# Patient Record
Sex: MALE | URBAN - METROPOLITAN AREA
[De-identification: names, ages, dates, MRNs, and addresses within clinical notes are randomized per-mention and may not be internally consistent; named-entity substitution may affect disease eponyms.]

---

## 2022-05-31 ENCOUNTER — CLINICAL SUPPORT (OUTPATIENT)
Dept: REHABILITATION | Facility: OTHER | Age: 57
End: 2022-05-31
Payer: MEDICAID

## 2022-05-31 DIAGNOSIS — M25.612 DECREASED RANGE OF MOTION OF LEFT SHOULDER: ICD-10-CM

## 2022-05-31 DIAGNOSIS — R29.898 SHOULDER WEAKNESS: ICD-10-CM

## 2022-05-31 DIAGNOSIS — R68.89 DECREASED FUNCTIONAL ACTIVITY TOLERANCE: ICD-10-CM

## 2022-05-31 PROCEDURE — 97161 PT EVAL LOW COMPLEX 20 MIN: CPT

## 2022-05-31 PROCEDURE — 97110 THERAPEUTIC EXERCISES: CPT

## 2022-05-31 PROCEDURE — 97530 THERAPEUTIC ACTIVITIES: CPT
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
## 2022-06-10 ENCOUNTER — CLINICAL SUPPORT (OUTPATIENT)
Dept: REHABILITATION | Facility: OTHER | Age: 57
End: 2022-06-10
Attending: NURSE PRACTITIONER
Payer: MEDICAID

## 2022-06-10 DIAGNOSIS — R68.89 DECREASED FUNCTIONAL ACTIVITY TOLERANCE: Primary | ICD-10-CM

## 2022-06-10 DIAGNOSIS — M25.612 DECREASED RANGE OF MOTION OF LEFT SHOULDER: ICD-10-CM

## 2022-06-10 DIAGNOSIS — R29.898 SHOULDER WEAKNESS: ICD-10-CM

## 2022-06-10 PROCEDURE — 97110 THERAPEUTIC EXERCISES: CPT

## 2022-06-10 NOTE — PROGRESS NOTES
"Physical Therapy Daily Treatment Note     Name: Jay [facility] Number: [id number]    Therapy Diagnosis:   Encounter Diagnoses   Name Primary?    Decreased functional activity tolerance Yes    Shoulder weakness     Decreased range of motion of left shoulder      Physician: Gaby Lan NP-C    Visit Date: 6/10/2022  Physician Orders: PT Eval and Treat   Medical Diagnosis from Referral: Left shoulder pain [M25.512]  Evaluation Date: 5/31/2022  Authorization Period Expiration: 12/31/2022  Plan of Care Expiration: 07/26/2022  Visit # / Visits authorized: 2/ 1    Progress Note Due: 06/20/2022  FOTO: 1/1     Precautions: Standard     Time In: 0935  Time Out: 1030  Total Appointment Time (timed & untimed codes): 55 minutes    Subjective     Merrill arrives to PT and reports a 3/10 L shoulder pain."The exercises I was given are helping for sure".     He was compliant with home exercise program.  Response to previous treatment: Fine  Functional change: NA    Pain:  Current 3/10, worst 10/10, best 0/10   Location: left shoulder   Description: Sharp and Shooting  Aggravating Factors: reaching across body, fast motions, lifting  Easing Factors: rest, no movement     Current Level of Function: independent with all ADL's, however, increased pain/difficulty with lifting and reaching tasks that involve left shoulder      Pts goals: "get rid of pain especially when I lift my arm"     Objective       Jay received therapeutic exercises to develop strength, endurance, ROM, flexibility, posture and core stabilization for 50 minutes including:    Aerobic/Cardio Exercise: UBE at level 1 for 6 minutes total 3:3 (F/B)    Pulleys 2 minutes each flexion and abduction   SOC ==> Scap retractions 20x5"  No Moneys with RTB 2x10  Supine shoulder Hrzt Abduction with RTB 2x 10   Rows with RTB 2x 10  +Supine shoulder flexion x20  +Supine serratus punch x20    Jay received the following manual therapy techniques: 5 " Minutes:  DMT/CFR/MFR with Jefry pain cream to Deltoids, distal pecs and RC muscles, seated L UE supported on pillow     Jay participated in neuromuscular re-education: 00 Minutes    Jay received hot pack for 00 minutes.    Jay received cold pack for 00 minutes.      Home Exercises Provided and Patient Education Provided     Education provided:     Written Home Exercises Provided: Patient instructed to cont prior HEP.  Exercises were reviewed and Jay was able to demonstrate them prior to the end of the session.  Jay demonstrated good  understanding of the education provided.     See EMR under Patient Instructions for exercises provided prior visit.    Assessment     Merrill reported improved pain post therex. New exercises were added to address poor neck/shoulders alignment/position. HEP was updated to include today's additional exercises.     Jay Is progressing  towards his goals.   Pt prognosis is Good.     Pt will continue to benefit from skilled outpatient physical therapy to address the deficits listed in the problem list box on initial evaluation, provide pt/family education and to maximize pt's level of independence in the home and community environment.     Pt's spiritual, cultural and educational needs considered and pt agreeable to plan of care and goals.     Anticipated barriers to physical therapy: None    GOALS: Short Term Goals: 4 weeks  1.Report decreased in pain at worse less than  <   / =  9 /10  to increase tolerance for functional mobility. On going  2. Pt to improve range of motion by 25% to allow for improved functional mobility to allow for improvement in IADLs. On going  3. Increased bilateral shoulder MMT 1/2 grade to increase tolerance for ADL and work activities. On going  4. Pt to report be conscious of impaired sitting and standing posture daily to decrease pain. On going  5. Pt to tolerate HEP to improve ROM and independence with ADL's. On going     Long Term  Goals: 8 weeks  1.Report decreased in pain at worse less than  <   / =  1  /10  to increase tolerance for functional mobility. On going  2.  Patient will demonstrate improved overall function per FOTO Survey to 33% score or less.On going  3.Increased bilateral shoulder MMT 1 grade to increase tolerance for ADL and work activities.On going  4. Pt to report and demonstrate proper posture in standing and sitting to decrease pain. On going  5. Pt to be Independent with HEP to improve ROM and independence with ADL's.On going  6. Pt to improve range of motion by 75% to allow for improved functional mobility to allow for improvement in IADLs. On going    Plan     Plan of care Certification: 5/31/2022 to 07/26/2022    Puja Jaquez, PT

## 2022-06-15 ENCOUNTER — CLINICAL SUPPORT (OUTPATIENT)
Dept: REHABILITATION | Facility: OTHER | Age: 57
End: 2022-06-15
Attending: NURSE PRACTITIONER
Payer: MEDICAID

## 2022-06-15 DIAGNOSIS — R29.898 SHOULDER WEAKNESS: ICD-10-CM

## 2022-06-15 DIAGNOSIS — R68.89 DECREASED FUNCTIONAL ACTIVITY TOLERANCE: Primary | ICD-10-CM

## 2022-06-15 DIAGNOSIS — M25.612 DECREASED RANGE OF MOTION OF LEFT SHOULDER: ICD-10-CM

## 2022-06-15 PROCEDURE — 97110 THERAPEUTIC EXERCISES: CPT

## 2022-06-15 NOTE — PROGRESS NOTES
"Physical Therapy Daily Treatment Note     Name: Jay Gilliland Joyce  Clinic Number: [id number]    Therapy Diagnosis:   Encounter Diagnoses   Name Primary?    Decreased functional activity tolerance Yes    Shoulder weakness     Decreased range of motion of left shoulder      Physician: Gaby Lan NP-C    Visit Date: 6/15/2022  Physician Orders: PT Eval and Treat   Medical Diagnosis from Referral: Left shoulder pain [M25.512]  Evaluation Date: 5/31/2022  Authorization Period Expiration: 12/31/2022  Plan of Care Expiration: 07/26/2022  Visit # / Visits authorized: 3/ 1    Progress Note Due: 06/20/2022  FOTO: 1/1     Precautions: Standard     Time In: 1106  Time Out: 1200  Total Appointment Time (timed & untimed codes): 54 minutes    Subjective     Merrill returns to PT today and denies any pain currently, the shoulder has bene feeling better. He denies any soreness after last session. "Lately it don't seem to get to bad with the pain".     He was compliant with home exercise program.  Response to previous treatment: Fine  Functional change: NA    Pain:  Current 0/10, worst 8/10 lately (10/10 at eval), best 0/10   Location: Left shoulder   Description: Sharp and Shooting  Aggravating Factors: reaching across body, fast motions, lifting  Easing Factors: rest, no movement     Current Level of Function: independent with all ADL's, however, increased pain/difficulty with lifting and reaching tasks that involve left shoulder      Pts goals: "get rid of pain especially when I lift my arm"     Objective       Merrill received therapeutic exercises to develop strength, endurance, ROM, flexibility, posture and core stabilization for 54 minutes including:    Aerobic/Cardio Exercise: UBE at level 2 for 6 minutes total 3:3 (F/B)    Pulleys 2 minutes each flexion and abduction   SOC ==> Scap retractions 20x5"  No Moneys with RTB 2x10  Supine shoulder Hrzt Abduction with RTB 2x 10  Single single UE hrzt abduction with RTB x10 " "each side  Rows with RTB x20  Supine shoulder protractions and retractions with 3# dowel 2x 10  + Mini Shoulder extension (waist level) and scap retraction with RTB 2x10 to engage sap depressors  + Full shoulders extension with RTB 10x3"  +Shoulder taps in quadruped alternating x10 each   +Cat/cow in quadruped x10  +Prone row+elbow ext with 5# DB x10 each side    Supine shoulder flexion ==> painful (hold 6/15/22)    Merrill received the following manual therapy techniques: 00 Minutes:  DMT/CFR/MFR with Jefry pain cream to Deltoids, distal pecs and RC muscles, seated L UE supported on pillow     Merrill participated in neuromuscular re-education: 00 Minutes    Merrill received hot pack for 00 minutes.    Merrill received cold pack for 00 minutes.      Home Exercises Provided and Patient Education Provided     Education provided:     Written Home Exercises Provided: Patient instructed to cont prior HEP.  Exercises were reviewed and Merrill was able to demonstrate them prior to the end of the session.  Merrill demonstrated good  understanding of the education provided.     See EMR under Patient Instructions for exercises provided prior visit.    Assessment     Merrill tolerated therex progression well without increasing pain at L shoulder. However constant cues provided to prevent scap elevation compensation with exercises. Address scap depressors strengthening, with GHJ and scap/thoracic joint stabilization and strengthening Bilaterally.       Merrill Is progressing  towards his goals.   Pt prognosis is Good.     Pt will continue to benefit from skilled outpatient physical therapy to address the deficits listed in the problem list box on initial evaluation, provide pt/family education and to maximize pt's level of independence in the home and community environment.     Pt's spiritual, cultural and educational needs considered and pt agreeable to plan of care and goals.     Anticipated barriers to physical therapy: " None    GOALS: Short Term Goals: 4 weeks  1.Report decreased in pain at worse less than  <   / =  9 /10  to increase tolerance for functional mobility. On going  2. Pt to improve range of motion by 25% to allow for improved functional mobility to allow for improvement in IADLs. On going  3. Increased bilateral shoulder MMT 1/2 grade to increase tolerance for ADL and work activities. On going  4. Pt to report be conscious of impaired sitting and standing posture daily to decrease pain. On going  5. Pt to tolerate HEP to improve ROM and independence with ADL's. On going     Long Term Goals: 8 weeks  1.Report decreased in pain at worse less than  <   / =  1  /10  to increase tolerance for functional mobility. On going  2.  Patient will demonstrate improved overall function per FOTO Survey to 33% score or less.On going  3.Increased bilateral shoulder MMT 1 grade to increase tolerance for ADL and work activities.On going  4. Pt to report and demonstrate proper posture in standing and sitting to decrease pain. On going  5. Pt to be Independent with HEP to improve ROM and independence with ADL's.On going  6. Pt to improve range of motion by 75% to allow for improved functional mobility to allow for improvement in IADLs. On going    Plan     Plan of care Certification: 5/31/2022 to 07/26/2022    Puja Jaquez, PT

## 2022-06-16 NOTE — PROGRESS NOTES
"Physical Therapy Daily Treatment Note     Name: Jay Gilliland [place]  Clinic Number: [id number]    Therapy Diagnosis:   Encounter Diagnoses   Name Primary?    Decreased functional activity tolerance Yes    Shoulder weakness     Decreased range of motion of left shoulder      Physician: Gaby Lan NP-C    Visit Date: 6/17/2022  Physician Orders: PT Eval and Treat   Medical Diagnosis from Referral: Left shoulder pain [M25.512]  Evaluation Date: 5/31/2022  Authorization Period Expiration: 12/31/2022  Plan of Care Expiration: 07/26/2022  Visit # / Visits authorized: 4/20  Progress Note Due: 06/20/2022  FOTO: 1/1     Precautions: Standard     Time In:8:30am  Time Out: 9:25am  Total Appointment Time (timed & untimed codes): 55 minutes    Subjective   Patient states he is still having pain with overhead motions and woke up with a muscle spasm in L shoulder this morning.     He was compliant with home exercise program.  Response to previous treatment: Fine  Functional change: NA    Pain:  Current 0/10, worst 8/10 lately (10/10 at eval), best 0/10   Location: Left shoulder   Description: Sharp and Shooting  Aggravating Factors: reaching across body, fast motions, lifting  Easing Factors: rest, no movement     Current Level of Function: independent with all ADL's, however, increased pain/difficulty with lifting and reaching tasks that involve left shoulder      Pts goals: "get rid of pain especially when I lift my arm"     Objective       Merrill received therapeutic exercises to develop strength, endurance, ROM, flexibility, posture and core stabilization for 55 minutes including:    Aerobic/Cardio Exercise: UBE at level 2 for 6 minutes total 3:3 (F/B)     Pulleys 2 minutes each flexion and abduction   SOC ==> Scap retractions 20x5"  No Moneys with RTB x15,3"  Half FR:Supine shoulder Hrzt Abduction with RTB x15, SA punches w/3# dowel x20   Rows with GTB 2x10,3"   Full shoulders extension with RTB 10x3"  Shoulder taps " in quadruped alternating x10 each   Cat/cow in quadruped x15  Prone row+elbow ext with 5# DB 2x10 each side  +Shoulder flexion w/pillowcase at table x10    Supine shoulder flexion ==> painful (hold 6/15/22)    Merrill received the following manual therapy techniques: 10 Minutes:  Posterior/inferior glides to R GH joint grave III, distraction/oscillations, Passive ER    Merrill participated in neuromuscular re-education: 00 Minutes    Merrill received hot pack for 00 minutes.    Merrill received cold pack for 00 minutes.      Home Exercises Provided and Patient Education Provided     Education provided:     Written Home Exercises Provided: Patient instructed to cont prior HEP.  Exercises were reviewed and Merrill was able to demonstrate them prior to the end of the session.  Merrill demonstrated good  understanding of the education provided.     See EMR under Patient Instructions for exercises provided prior visit.    Assessment   Patient still unable to perform shoulder flexion in a supine position without increased pain. Recommended patient begin pulley ex at home to address decreased ROM. Progressed resistance with rows and provided GTB for home use. Patient requires max VC to keep UT quiet. Continue to progress as patient tolerates.          Merrill Is progressing  towards his goals.   Pt prognosis is Good.     Pt will continue to benefit from skilled outpatient physical therapy to address the deficits listed in the problem list box on initial evaluation, provide pt/family education and to maximize pt's level of independence in the home and community environment.     Pt's spiritual, cultural and educational needs considered and pt agreeable to plan of care and goals.     Anticipated barriers to physical therapy: None    GOALS: Short Term Goals: 4 weeks  1.Report decreased in pain at worse less than  <   / =  9 /10  to increase tolerance for functional mobility. On going  2. Pt to improve range of motion by 25% to  allow for improved functional mobility to allow for improvement in IADLs. On going  3. Increased bilateral shoulder MMT 1/2 grade to increase tolerance for ADL and work activities. On going  4. Pt to report be conscious of impaired sitting and standing posture daily to decrease pain. On going  5. Pt to tolerate HEP to improve ROM and independence with ADL's. On going     Long Term Goals: 8 weeks  1.Report decreased in pain at worse less than  <   / =  1  /10  to increase tolerance for functional mobility. On going  2.  Patient will demonstrate improved overall function per FOTO Survey to 33% score or less.On going  3.Increased bilateral shoulder MMT 1 grade to increase tolerance for ADL and work activities.On going  4. Pt to report and demonstrate proper posture in standing and sitting to decrease pain. On going  5. Pt to be Independent with HEP to improve ROM and independence with ADL's.On going  6. Pt to improve range of motion by 75% to allow for improved functional mobility to allow for improvement in IADLs. On going    Plan     Plan of care Certification: 5/31/2022 to 07/26/2022    Gaby Silva PTA

## 2022-06-17 ENCOUNTER — CLINICAL SUPPORT (OUTPATIENT)
Dept: REHABILITATION | Facility: OTHER | Age: 57
End: 2022-06-17
Attending: NURSE PRACTITIONER
Payer: MEDICAID

## 2022-06-17 DIAGNOSIS — R29.898 SHOULDER WEAKNESS: ICD-10-CM

## 2022-06-17 DIAGNOSIS — R68.89 DECREASED FUNCTIONAL ACTIVITY TOLERANCE: Primary | ICD-10-CM

## 2022-06-17 DIAGNOSIS — M25.612 DECREASED RANGE OF MOTION OF LEFT SHOULDER: ICD-10-CM

## 2022-06-17 PROCEDURE — 97110 THERAPEUTIC EXERCISES: CPT | Mod: CQ

## 2022-06-20 ENCOUNTER — CLINICAL SUPPORT (OUTPATIENT)
Dept: REHABILITATION | Facility: OTHER | Age: 57
End: 2022-06-20
Attending: NURSE PRACTITIONER
Payer: MEDICAID

## 2022-06-20 DIAGNOSIS — M25.612 DECREASED RANGE OF MOTION OF LEFT SHOULDER: ICD-10-CM

## 2022-06-20 DIAGNOSIS — R68.89 DECREASED FUNCTIONAL ACTIVITY TOLERANCE: Primary | ICD-10-CM

## 2022-06-20 DIAGNOSIS — R29.898 SHOULDER WEAKNESS: ICD-10-CM

## 2022-06-20 PROCEDURE — 97110 THERAPEUTIC EXERCISES: CPT | Mod: CQ

## 2022-06-20 NOTE — PROGRESS NOTES
"Physical Therapy Daily Treatment Note     Name: Jay Gilliland [place]  Clinic Number: [id number]    Therapy Diagnosis:   Encounter Diagnoses   Name Primary?    Decreased functional activity tolerance Yes    Shoulder weakness     Decreased range of motion of left shoulder      Physician: Gaby Lan NP-C    Visit Date: 6/20/2022  Physician Orders: PT Eval and Treat   Medical Diagnosis from Referral: Left shoulder pain [M25.512]  Evaluation Date: 5/31/2022  Authorization Period Expiration: 12/31/2022  Plan of Care Expiration: 07/26/2022  Visit # / Visits authorized: 5/20  Progress Note Due: 06/20/2022  FOTO: 2/2     Precautions: Standard     Time In: 8:55 am  Time Out: 9:55 am  Total Appointment Time (timed & untimed codes): 55 minutes    Subjective   Patient states "I haven't noticed too much pain lately.  Ever since I started doing these exercises it hasn't been bothering me nearly as much".  He still reports sharp pain with quick overhead motions.    He was compliant with home exercise program.  Response to previous treatment: No increase in soreness  Functional change: NA    Pain:  Current 0/10, worst 8/10 lately (10/10 at eval), best 0/10   Location: Left shoulder   Description: Sharp and Shooting  Aggravating Factors: reaching across body, fast motions, lifting  Easing Factors: rest, no movement     Current Level of Function: independent with all ADL's, however, increased pain/difficulty with lifting and reaching tasks that involve left shoulder      Pts goals: "get rid of pain especially when I lift my arm"     Objective       Merrill received therapeutic exercises to develop strength, endurance, ROM, flexibility, posture and core stabilization for 45 minutes including:    Aerobic/Cardio Exercise: UBE at level 2 for 6 minutes total 3:3 (F/B)     Pulleys 2 minutes each flexion and abduction   SOC ==> Scap retractions 20x5"  No Moneys with RTB x15,3"  Half FR:Supine shoulder Hrzt Abduction with RTB x+20, SA " "punches w/3# dowel x20   Supine shoulder flexion x20  +SL ER #1 2 x10  Rows with GTB 2x10,3"   Full shoulders extension with RTB 10x3"  Shoulder taps in quadruped alternating x10 each   Cat/cow in quadruped x15  Prone row+elbow ext with 5# DB 2x10 each side  Shoulder flexion w/pillowcase at table x10  +Serratus wall slides 2x10  +Sleeper stretch 3x20"      Merrill received the following manual therapy techniques: 10 Minutes:  Posterior/inferior glides to L GH joint grade III, distraction/oscillations, Passive ER    Merrill participated in neuromuscular re-education: 00 Minutes    Merrill received hot pack for 00 minutes.    Merrill received cold pack for 5 minutes.      Home Exercises Provided and Patient Education Provided     Education provided:     Written Home Exercises Provided: Patient instructed to cont prior HEP.  Exercises were reviewed and Merrill was able to demonstrate them prior to the end of the session.  Merrill demonstrated good  understanding of the education provided.     See EMR under Patient Instructions for exercises provided prior visit.    Assessment   Merrill returned reporting minimal to no L shoulder pain and expressing satisfaction with progress made so far.  L shoulder strengthening, postural strengthening, L shoulder flexibility continued with Merrill able to perform supine shoulder flexion with less difficulty and pain today.  Treatment progressed by adding SL ER, serratus wall slides and sleeper stretch.  He tolerated today's progressions well with no increase in pain.  FOTO limitation score improved to 49% indicating slight improvement in function and pain reduction.  Will continue to progress per pt's tolerance.    Merrill Is progressing  towards his goals.   Pt prognosis is Good.     Pt will continue to benefit from skilled outpatient physical therapy to address the deficits listed in the problem list box on initial evaluation, provide pt/family education and to maximize pt's level " of independence in the home and community environment.     Pt's spiritual, cultural and educational needs considered and pt agreeable to plan of care and goals.     Anticipated barriers to physical therapy: None    GOALS: Short Term Goals: 4 weeks  1.Report decreased in pain at worse less than  <   / =  9 /10  to increase tolerance for functional mobility. On going  2. Pt to improve range of motion by 25% to allow for improved functional mobility to allow for improvement in IADLs. On going  3. Increased bilateral shoulder MMT 1/2 grade to increase tolerance for ADL and work activities. On going  4. Pt to report be conscious of impaired sitting and standing posture daily to decrease pain. On going  5. Pt to tolerate HEP to improve ROM and independence with ADL's. On going     Long Term Goals: 8 weeks  1.Report decreased in pain at worse less than  <   / =  1  /10  to increase tolerance for functional mobility. On going  2.  Patient will demonstrate improved overall function per FOTO Survey to 33% score or less.On going  3.Increased bilateral shoulder MMT 1 grade to increase tolerance for ADL and work activities.On going  4. Pt to report and demonstrate proper posture in standing and sitting to decrease pain. On going  5. Pt to be Independent with HEP to improve ROM and independence with ADL's.On going  6. Pt to improve range of motion by 75% to allow for improved functional mobility to allow for improvement in IADLs. On going    Plan     Plan of care Certification: 5/31/2022 to 07/26/2022    Brody Burgos, PTA

## 2022-06-24 ENCOUNTER — CLINICAL SUPPORT (OUTPATIENT)
Dept: REHABILITATION | Facility: OTHER | Age: 57
End: 2022-06-24
Attending: NURSE PRACTITIONER
Payer: MEDICAID

## 2022-06-24 DIAGNOSIS — R29.898 SHOULDER WEAKNESS: ICD-10-CM

## 2022-06-24 DIAGNOSIS — M25.612 DECREASED RANGE OF MOTION OF LEFT SHOULDER: ICD-10-CM

## 2022-06-24 DIAGNOSIS — R68.89 DECREASED FUNCTIONAL ACTIVITY TOLERANCE: Primary | ICD-10-CM

## 2022-06-24 PROCEDURE — 97110 THERAPEUTIC EXERCISES: CPT | Mod: CQ

## 2022-06-24 NOTE — PROGRESS NOTES
"Physical Therapy Daily Treatment Note     Name: Jay Gilliland [place]  Clinic Number: [id number]    Therapy Diagnosis:   Encounter Diagnoses   Name Primary?    Decreased functional activity tolerance Yes    Shoulder weakness     Decreased range of motion of left shoulder      Physician: Gaby Lan NP-C    Visit Date: 6/24/2022  Physician Orders: PT Eval and Treat   Medical Diagnosis from Referral: Left shoulder pain [M25.512]  Evaluation Date: 5/31/2022  Authorization Period Expiration: 12/31/2022  Plan of Care Expiration: 07/26/2022  Visit # / Visits authorized: 6/20    Progress Note Due: 06/20/2022 (PT not available for reassessment today-Will plan for reassessment next visit)  FOTO: 2/2     Precautions: Standard     Time In: 10:00 am  Time Out: 11:00 am  Total Appointment Time (timed & untimed codes): 55 minutes    Subjective   Patient states that his shoulder is feeling pretty good. " I don't feel like it is much of a handicap now." Still reports intermittent shoulder pain when reaching overhead. States that he was dealing with some stress due to the loss of his cell phone service.     He was compliant with home exercise program.  Response to previous treatment: No increase in soreness  Functional change: NA    Pain:  Current 0/10, worst 3/10 lately (10/10 at eval), best 0/10   Location: Left shoulder   Description: Sharp and Shooting  Aggravating Factors: reaching across body, fast motions, lifting  Easing Factors: rest, no movement     Current Level of Function: independent with all ADL's, however, increased pain/difficulty with lifting and reaching tasks that involve left shoulder      Pts goals: "get rid of pain especially when I lift my arm"     Objective       Merrill received therapeutic exercises to develop strength, endurance, ROM, flexibility, posture and core stabilization for 45 minutes including:    Aerobic/Cardio Exercise: UBE at level 3 for 6 minutes total 3:3 (F/B)     Pulleys 2 minutes each " "flexion and abduction   SOC ==> Scap retractions 20x5"  No Moneys with GTB 2 x10,3"  Serratus wall slides w/RTB 2x10  Rows with GTB 2x10,3"  shoulder extension with GTB2x 10x3"  Shoulder taps alternating standing against plinth x10 each   Supine over Half FR:   Supine shoulder Hrzt Abduction with RTB x+20,    SA punches w/3# DBx20    Supine shoulder flexion w/ 2# dowel 2 x 10    SL ER 2# 2 x10  Cat/cow in quadruped x15  +Quadruped Reach roll and lift x 10  Prone row+elbow ext with 5# DB 2x10 each side  Shoulder flexion w/pillowcase at table x10  Sleeper stretch 3x20"  +Prone over T-ball (on Mat)   Y's x10   T'sx10   w's x 10  +Supine Rhythmic stabilization 3 x 20 sec    Merrill received the following manual therapy techniques: 10 Minutes:  Posterior/inferior glides to L GH joint grade III, distraction/oscillations, Passive ER/IR      Merrill received cold pack for 5 minutes.    Home Exercises Provided and Patient Education Provided     Education provided:   -Cues with ex's  -Postural awareness    Written Home Exercises Provided: Patient instructed to cont prior HEP.  Exercises were reviewed and Merrill was able to demonstrate them prior to the end of the session.  Merrill demonstrated good  understanding of the education provided.     See EMR under Patient Instructions for exercises provided prior visit.    Assessment   Merrill returns without c/o pain today. Treatment continued with Shoulder/postural flexibility and strengthening ex's. He was progressed with increased resistance on UBE, increased resistance to RTB for serratus slides. Also added prone strengthening over T-ball and supine rhythmic stabilization.  Some weakness evident with Rhythmic stabilization ex. Patient able to perform ex's and progressions within tolerable muscular fatigue and without increased pain.  He does require intermittent cues for correct ex performance. PT was not available for reassessment this visit and will look to arrange this for " pt's next visit. Will monitor and attempt to progress as tolerated.     Merrill Is progressing towards his goals.   Pt prognosis is Good.     Pt will continue to benefit from skilled outpatient physical therapy to address the deficits listed in the problem list box on initial evaluation, provide pt/family education and to maximize pt's level of independence in the home and community environment.     Pt's spiritual, cultural and educational needs considered and pt agreeable to plan of care and goals.     Anticipated barriers to physical therapy: None    GOALS: Short Term Goals: 4 weeks  1.Report decreased in pain at worse less than  <   / =  9 /10  to increase tolerance for functional mobility. On going  2. Pt to improve range of motion by 25% to allow for improved functional mobility to allow for improvement in IADLs. On going  3. Increased bilateral shoulder MMT 1/2 grade to increase tolerance for ADL and work activities. On going  4. Pt to report be conscious of impaired sitting and standing posture daily to decrease pain. On going  5. Pt to tolerate HEP to improve ROM and independence with ADL's. On going     Long Term Goals: 8 weeks  1.Report decreased in pain at worse less than  <   / =  1  /10  to increase tolerance for functional mobility. On going  2.  Patient will demonstrate improved overall function per FOTO Survey to 33% score or less.On going  3.Increased bilateral shoulder MMT 1 grade to increase tolerance for ADL and work activities.On going  4. Pt to report and demonstrate proper posture in standing and sitting to decrease pain. On going  5. Pt to be Independent with HEP to improve ROM and independence with ADL's.On going  6. Pt to improve range of motion by 75% to allow for improved functional mobility to allow for improvement in IADLs. On going    Plan     Plan of care Certification: 5/31/2022 to 07/26/2022    Outpatient Physical Therapy 2 times weekly for 8 weeks to include the following  interventions: Cervical/Lumbar Traction, Electrical Stimulation PRN, Gait Training, Iontophoresis (with PRN), Manual Therapy, Moist Heat/ Ice, Neuromuscular Re-ed, Patient Education, Self Care, Therapeutic Activities and Therapeutic Exercise. Dry needling Progress HEP towards D/C. Recommend F/U with MD if symptoms worsen or do not resolve. Patient may be seen by a PTA for treatment to carry out their plan of care.  Face-to-face conferences will be held.    Otis Rodgers PTA

## 2022-06-28 ENCOUNTER — CLINICAL SUPPORT (OUTPATIENT)
Dept: REHABILITATION | Facility: OTHER | Age: 57
End: 2022-06-28
Attending: NURSE PRACTITIONER
Payer: MEDICAID

## 2022-06-28 DIAGNOSIS — M25.612 DECREASED RANGE OF MOTION OF LEFT SHOULDER: ICD-10-CM

## 2022-06-28 DIAGNOSIS — R29.898 SHOULDER WEAKNESS: ICD-10-CM

## 2022-06-28 DIAGNOSIS — R68.89 DECREASED FUNCTIONAL ACTIVITY TOLERANCE: Primary | ICD-10-CM

## 2022-06-28 PROCEDURE — 97110 THERAPEUTIC EXERCISES: CPT

## 2022-06-28 NOTE — PROGRESS NOTES
"Physical Therapy Daily Treatment Note     Name: Jay Joyce  Clinic Number: [id number]    Therapy Diagnosis:   Encounter Diagnoses   Name Primary?    Decreased functional activity tolerance Yes    Shoulder weakness     Decreased range of motion of left shoulder      Physician: Gaby Lan NP-C    Visit Date: 6/28/2022  Physician Orders: PT Eval and Treat   Medical Diagnosis from Referral: Left shoulder pain [M25.512]  Evaluation Date: 5/31/2022  Authorization Period Expiration: 12/31/2022  Plan of Care Expiration: 07/26/2022  Visit # / Visits authorized: 6/20    Progress Note Due: 07/28/2022   FOTO: 2/2     Precautions: Standard     Time In: 10:00 am  Time Out: 11:00 am  Total Appointment Time (timed & untimed codes): 55 minutes    Subjective   Patient states he has been having overall decreased pain lately. Since Friday his arm has been a little achier than normal.     He was compliant with home exercise program.  Response to previous treatment: No increase in soreness  Functional change: NA    Pain:  Current 1/10, worst 3/10 lately (10/10 at eval), best 0/10   Location: Left shoulder   Description: Sharp and Shooting  Aggravating Factors: reaching across body, fast motions, lifting  Easing Factors: rest, no movement     Current Level of Function: independent with all ADL's, however, increased pain/difficulty with lifting and reaching tasks that involve left shoulder      Pts goals: "get rid of pain especially when I lift my arm"     Objective   Reassessment 6/28/22  Active Range of Motion (degrees):   Shoulder Right Left   Flexion 170 150   Abduction 175 150   ER WFL 70   IR  WFL 60*      Upper Extremity Strength  (R) UE   (L) UE     Elbow flexion: 4+/5 Elbow flexion: 4+/5   Elbow extension: 4+/5 Elbow extension: 4+/5   Shoulder elevation: 4/5 Shoulder elevation: 4-/5   Shoulder flexion: 4/5 Shoulder flexion: 4-/5   Shoulder Abduction: 4/5 Shoulder abduction: 3+/5   Shoulder ER 4+/5 Shoulder ER 3+/5 " "  Shoulder IR 4+/5 Shoulder IR 3+/5   Lower Trap 3/5 Lower Trap 3/5   Middle Trap 3/5 Middle Trap 3/5   Rhomboids 3/5 Rhomboids 3/5          Merrill received therapeutic exercises to develop strength, endurance, ROM, flexibility, posture and core stabilization for 60 minutes including:    Reassessment    Aerobic/Cardio Exercise: UBE at level 3 for 6 minutes total 3:3 (F/B)     Pulleys 2 minutes each flexion and abduction   SOC ==> Scap retractions 20x5"  No Moneys with GTB 2 x10,3"  Serratus wall slides w/RTB 2x10  Rows with GTB 3x10,3"  shoulder extension with GTB2x 10x3"   Shoulder taps alternating standing against plinth x10 each   Supine over Half FR:   Supine shoulder Hrzt Abduction with RTB x+20,    SA punches w/3# DBx20    Supine shoulder flexion w/ 2# dowel 2 x 10    SL ER 2# 2 x10  Cat/cow in quadruped x15  +Quadruped Reach roll and lift x 10  Prone row+elbow ext with 5# DB 2x10 each side  Shoulder flexion w/pillowcase at table x10  Sleeper stretch 3x20"  Prone over T-ball (on Mat)   Y's x10   T'sx10   w's x 10  Supine Rhythmic stabilization 3 x 20 sec  +supine shoulder flexion with 3# dowel x 20  +Supine serratus punches with 3# dowel x 20  +SL flexion to 90 degrees 1# 2x5  +SL abduction      Merrill received the following manual therapy techniques: 0 Minutes:  Posterior/inferior glides to L GH joint grade III, distraction/oscillations, Passive ER/IR      Merrill received cold pack for 5 minutes.    Home Exercises Provided and Patient Education Provided     Education provided:   -Cues with ex's  -Postural awareness    Written Home Exercises Provided: Patient instructed to cont prior HEP.  Exercises were reviewed and Merrill was able to demonstrate them prior to the end of the session.  Merrill demonstrated good  understanding of the education provided.     See EMR under Patient Instructions for exercises provided prior visit.    Assessment   Merrill was reassessed today. Despite reports of improvement in " pain levels, he demos minimal improvements in strength since evaluation. Limitations are especially evident with flexion and abduction. Today's treatment session focused on flexion abduction strengthening in both standing and in sidelying. He continues to require intermittent cues for correct ex performance. Cont to progress as appropriate.     Merrill Is progressing towards his goals.   Pt prognosis is Good.     Pt will continue to benefit from skilled outpatient physical therapy to address the deficits listed in the problem list box on initial evaluation, provide pt/family education and to maximize pt's level of independence in the home and community environment.     Pt's spiritual, cultural and educational needs considered and pt agreeable to plan of care and goals.     Anticipated barriers to physical therapy: None    GOALS: Short Term Goals: 4 weeks  1.Report decreased in pain at worse less than  <   / =  9 /10  to increase tolerance for functional mobility. On going  2. Pt to improve range of motion by 25% to allow for improved functional mobility to allow for improvement in IADLs. On going  3. Increased bilateral shoulder MMT 1/2 grade to increase tolerance for ADL and work activities. On going  4. Pt to report be conscious of impaired sitting and standing posture daily to decrease pain. On going  5. Pt to tolerate HEP to improve ROM and independence with ADL's. On going     Long Term Goals: 8 weeks  1.Report decreased in pain at worse less than  <   / =  1  /10  to increase tolerance for functional mobility. On going  2.  Patient will demonstrate improved overall function per FOTO Survey to 33% score or less.On going  3.Increased bilateral shoulder MMT 1 grade to increase tolerance for ADL and work activities.On going  4. Pt to report and demonstrate proper posture in standing and sitting to decrease pain. On going  5. Pt to be Independent with HEP to improve ROM and independence with ADL's.On going  6. Pt  to improve range of motion by 75% to allow for improved functional mobility to allow for improvement in IADLs. On going    Plan     Plan of care Certification: 5/31/2022 to 07/26/2022    Outpatient Physical Therapy 2 times weekly for 8 weeks to include the following interventions: Cervical/Lumbar Traction, Electrical Stimulation PRN, Gait Training, Iontophoresis (with PRN), Manual Therapy, Moist Heat/ Ice, Neuromuscular Re-ed, Patient Education, Self Care, Therapeutic Activities and Therapeutic Exercise. Dry needling Progress HEP towards D/C. Recommend F/U with MD if symptoms worsen or do not resolve. Patient may be seen by a PTA for treatment to carry out their plan of care.  Face-to-face conferences will be held.    Miles Samayoa, PT

## 2022-06-30 NOTE — PROGRESS NOTES
"Physical Therapy Daily Treatment Note     Name: Jay Joyce  Clinic Number: [id number]    Therapy Diagnosis:   Encounter Diagnoses   Name Primary?    Decreased functional activity tolerance Yes    Shoulder weakness     Decreased range of motion of left shoulder      Physician: Gaby Lan NP-C    Visit Date: 7/1/2022  Physician Orders: PT Eval and Treat   Medical Diagnosis from Referral: Left shoulder pain [M25.512]  Evaluation Date: 5/31/2022  Authorization Period Expiration: 12/31/2022  Plan of Care Expiration: 07/26/2022  Visit # / Visits authorized: 8/20    Progress Note Due: 07/28/2022   FOTO: 2/2     Precautions: Standard     Time In: 10:25 am  Time Out: 11:30 am  Total Appointment Time (timed & untimed codes): 55 minutes    Subjective   Patient states he doesn't notice much of a change with activities yet.   He was not compliant with home exercise program.  Response to previous treatment: No increase in soreness  Functional change: None yet    Pain:  Current 1/10, worst 3/10 lately (10/10 at eval), best 0/10   Location: Left shoulder   Description: Sharp and Shooting  Aggravating Factors: reaching across body, fast motions, lifting  Easing Factors: rest, no movement     Current Level of Function: independent with all ADL's, however, increased pain/difficulty with lifting and reaching tasks that involve left shoulder      Pts goals: "get rid of pain especially when I lift my arm"   Objective   Reassessment 6/28/22  Active Range of Motion (degrees):   Shoulder Right Left   Flexion 170 150   Abduction 175 150   ER WFL 70   IR  WFL 60*      Upper Extremity Strength  (R) UE   (L) UE     Elbow flexion: 4+/5 Elbow flexion: 4+/5   Elbow extension: 4+/5 Elbow extension: 4+/5   Shoulder elevation: 4/5 Shoulder elevation: 4-/5   Shoulder flexion: 4/5 Shoulder flexion: 4-/5   Shoulder Abduction: 4/5 Shoulder abduction: 3+/5   Shoulder ER 4+/5 Shoulder ER 3+/5   Shoulder IR 4+/5 Shoulder IR 3+/5   Lower Trap " "3/5 Lower Trap 3/5   Middle Trap 3/5 Middle Trap 3/5   Rhomboids 3/5 Rhomboids 3/5          Merrill received therapeutic exercises to develop strength, endurance, ROM, flexibility, posture and core stabilization for 55 minutes including:    Reassessment    Aerobic/Cardio Exercise: UBE at level 3 for 6 minutes total 3:3 (F/B) For shlder ROM    Pulleys 2 minutes each flexion and abduction   SOC ==> Scap retractions 20x5"  No Moneys with GTB 2 x10,3"  Serratus wall slides w/RTB w/lift off x10  Rows with GTB 3x10,3"  +shoulder IR w/RTB x20  +[place] carry w.10# in both hands 2 laps   +Bicep curl 38# x15  +Tricep 32# x15  shoulder extension with GTB2x 10x3"   Shoulder taps alternating standing against plinth x10 each   Supine over Half FR:   Supine shoulder Hrzt Abduction with GTB +20,    SA punches w/4# DBx20    Supine shoulder flexion w/ 3# dowel 2x10    B SL ER 2# 2 x10  Cat/cow in quadruped x15  +Quadruped Reach roll and lift x 10  Prone row+elbow ext with 5# DB 2x10 each side  Shoulder flexion w/pillowcase at table x10  Sleeper stretch 3x20"  Prone    Y's x10   T'sx10,3   w's x10,3"  Supine Rhythmic stabilization 3 x 20 sec  SL flexion to 90 degrees 1# 2x5  SL abduction w/2# x20 (not able on L)      Merrill received the following manual therapy techniques: 0 Minutes:  Posterior/inferior glides to L GH joint grade III, distraction/oscillations, Passive ER/IR      Merrill received cold pack for 5 minutes.    Home Exercises Provided and Patient Education Provided     Education provided:   -Cues with ex's  -Postural awareness    Written Home Exercises Provided: Patient instructed to cont prior HEP.  Exercises were reviewed and Merrill was able to demonstrate them prior to the end of the session.  Merrill demonstrated good  understanding of the education provided.     See EMR under Patient Instructions for exercises provided prior visit.    Assessment   Educated patient on importance of more frequent performance of HEP " to address significant weakness not allowing for pain free shoulder flex and HBB. Patient required cueing for proper. Unable to perform SL shoulder abd without increased pain. Cont to progress as appropriate.     Merrill Is progressing towards his goals.   Pt prognosis is Good.     Pt will continue to benefit from skilled outpatient physical therapy to address the deficits listed in the problem list box on initial evaluation, provide pt/family education and to maximize pt's level of independence in the home and community environment.     Pt's spiritual, cultural and educational needs considered and pt agreeable to plan of care and goals.     Anticipated barriers to physical therapy: None    GOALS: Short Term Goals: 4 weeks  1.Report decreased in pain at worse less than  <   / =  9 /10  to increase tolerance for functional mobility. On going  2. Pt to improve range of motion by 25% to allow for improved functional mobility to allow for improvement in IADLs. On going  3. Increased bilateral shoulder MMT 1/2 grade to increase tolerance for ADL and work activities. On going  4. Pt to report be conscious of impaired sitting and standing posture daily to decrease pain. On going  5. Pt to tolerate HEP to improve ROM and independence with ADL's. On going     Long Term Goals: 8 weeks  1.Report decreased in pain at worse less than  <   / =  1  /10  to increase tolerance for functional mobility. On going  2.  Patient will demonstrate improved overall function per FOTO Survey to 33% score or less.On going  3.Increased bilateral shoulder MMT 1 grade to increase tolerance for ADL and work activities.On going  4. Pt to report and demonstrate proper posture in standing and sitting to decrease pain. On going  5. Pt to be Independent with HEP to improve ROM and independence with ADL's.On going  6. Pt to improve range of motion by 75% to allow for improved functional mobility to allow for improvement in IADLs. On going    Plan      Plan of care Certification: 5/31/2022 to 07/26/2022    Outpatient Physical Therapy 2 times weekly for 8 weeks to include the following interventions: Cervical/Lumbar Traction, Electrical Stimulation PRN, Gait Training, Iontophoresis (with PRN), Manual Therapy, Moist Heat/ Ice, Neuromuscular Re-ed, Patient Education, Self Care, Therapeutic Activities and Therapeutic Exercise. Dry needling Progress HEP towards D/C. Recommend F/U with MD if symptoms worsen or do not resolve. Patient may be seen by a PTA for treatment to carry out their plan of care.  Face-to-face conferences will be held.    Gaby Silva PTA

## 2022-07-01 ENCOUNTER — CLINICAL SUPPORT (OUTPATIENT)
Dept: REHABILITATION | Facility: OTHER | Age: 57
End: 2022-07-01
Attending: NURSE PRACTITIONER
Payer: MEDICAID

## 2022-07-01 DIAGNOSIS — R68.89 DECREASED FUNCTIONAL ACTIVITY TOLERANCE: Primary | ICD-10-CM

## 2022-07-01 DIAGNOSIS — M25.612 DECREASED RANGE OF MOTION OF LEFT SHOULDER: ICD-10-CM

## 2022-07-01 DIAGNOSIS — R29.898 SHOULDER WEAKNESS: ICD-10-CM

## 2022-07-01 PROCEDURE — 97110 THERAPEUTIC EXERCISES: CPT | Mod: CQ

## 2023-04-30 ENCOUNTER — HOSPITAL ENCOUNTER (EMERGENCY)
Facility: OTHER | Age: 58
Discharge: HOME | End: 2023-04-30
Attending: EMERGENCY MEDICINE
Payer: MEDICAID

## 2023-04-30 VITALS
OXYGEN SATURATION: 98 % | DIASTOLIC BLOOD PRESSURE: 63 MMHG | SYSTOLIC BLOOD PRESSURE: 107 MMHG | TEMPERATURE: 98 F | BODY MASS INDEX: 25.69 KG/M2 | WEIGHT: 145 LBS | HEART RATE: 83 BPM | HEIGHT: 63 IN | RESPIRATION RATE: 18 BRPM

## 2023-04-30 DIAGNOSIS — L03.114 CELLULITIS OF LEFT HAND: Primary | ICD-10-CM

## 2023-04-30 PROCEDURE — 99283 EMERGENCY DEPT VISIT LOW MDM: CPT

## 2023-04-30 PROCEDURE — 25000003 PHARM REV CODE 250: Performed by: EMERGENCY MEDICINE

## 2023-04-30 RX ORDER — CLINDAMYCIN HYDROCHLORIDE 150 MG/1
450 CAPSULE ORAL EVERY 8 HOURS
Qty: 63 CAPSULE | Refills: 0 | Status: SHIPPED | OUTPATIENT
Start: 2023-04-30 | End: 2023-05-07

## 2023-04-30 RX ORDER — CLINDAMYCIN HYDROCHLORIDE 150 MG/1
450 CAPSULE ORAL
Status: COMPLETED | OUTPATIENT
Start: 2023-04-30 | End: 2023-04-30

## 2023-04-30 RX ADMIN — CLINDAMYCIN HYDROCHLORIDE 450 MG: 150 CAPSULE ORAL at 02:04

## 2023-04-30 NOTE — ED PROVIDER NOTES
Encounter Date: 4/30/2023    SCRIBE #1 NOTE: I, Chetnaherb Rogers, am scribing for, and in the presence of,  Ruth Dasilva MD.     History     Chief Complaint   Patient presents with    Hand Injury     Reports cutting left thumb with an angle  approx 1 week ago. +swelling and redness noted to left hand and wrist.      Parvez Lockhart is a 57 y.o. male who presents to the ED with swelling to his left hand after cutting it with a metal blade 1 week ago. The swelling has progressively worsened for the past few days. He denies fever, chills, or body ache. He tried soaking his hand in epsom salt, and cleaning with peroxide and alcohol. He also took 4-5 doxycycline pills he had in his house.  Tetanus is up-to-date.    The history is provided by the patient.   Review of patient's allergies indicates:   Allergen Reactions    Metoprolol     Flagyl [metronidazole] Hives     No past medical history on file.  No past surgical history on file.  No family history on file.     Review of Systems   Constitutional:  Negative for chills and fever.   HENT:  Negative for sore throat.    Respiratory:  Negative for shortness of breath.    Cardiovascular:  Negative for chest pain.   Gastrointestinal:  Negative for nausea.   Genitourinary:  Negative for dysuria.   Musculoskeletal:  Negative for back pain.        Left arm swelling.   Skin:  Positive for wound. Negative for rash.   Neurological:  Negative for weakness.   Hematological:  Does not bruise/bleed easily.   All other systems reviewed and are negative.    Physical Exam     Initial Vitals [04/30/23 0124]   BP Pulse Resp Temp SpO2   107/63 83 18 97.8 °F (36.6 °C) 98 %      MAP       --         Physical Exam    Constitutional: He appears well-developed and well-nourished. He does not have a sickly appearance. No distress.   HENT:   Head: Normocephalic and atraumatic.   Right Ear: External ear normal.   Left Ear: External ear normal.   Neck: Phonation normal. No stridor present. No tracheal  deviation present.   Normal range of motion.  Cardiovascular:  Normal rate, regular rhythm and normal heart sounds.     Exam reveals no friction rub.       No murmur heard.  Musculoskeletal:         General: Edema present.      Cervical back: Normal range of motion.      Comments: Trace pitting edema of dorsum of left hand and fingers. Full ROM all digits. No tenderness to palpation of palm. Compartments of left forearm and hand are soft. No lymphangitis.      Neurological: He is alert and oriented to person, place, and time. He has normal strength. GCS eye subscore is 4. GCS verbal subscore is 5. GCS motor subscore is 6.   Skin: Skin is warm.   Healing linear laceration to base of left thumb.    Psychiatric: He has a normal mood and affect. His speech is normal and behavior is normal. Judgment and thought content normal. Cognition and memory are normal.       ED Course   Procedures  Labs Reviewed - No data to display       Imaging Results    None          Medications   clindamycin capsule 450 mg (450 mg Oral Given 4/30/23 0252)     Medical Decision Making:   History:   Old Medical Records: I decided to obtain old medical records.  Additional MDM:   Comments: 57-year-old male presents for evaluation of left arm swelling and pain.  He does have a healing wound to the base of his thumb which he states occurred a week ago when he cut himself with a metal blade while working.  He denies any systemic symptoms.  Triage vital signs within normal limits.  On exam he does have mild pitting edema to the dorsum of the hand and trace pitting edema to the forearm.  Was noticeable erythema of both forearms.  The patient states that that is from getting sunburn yesterday.  It is difficult to determine if all of the erythema on the forearm is from the sunburn, however, given the edema and pain, I did discuss with him the plan to treat him for cellulitis.  He is amenable with this plan.  Will treat with clindamycin and have him  follow-up with his PCP within the next 72 hours for wound check.  Initial dose of antibiotics was given..      Scribe Attestation:   Scribe #1: I performed the above scribed service and the documentation accurately describes the services I performed. I attest to the accuracy of the note.  Physician Attestation for Scribe: I, Ruth Dasilva  , reviewed documentation as scribed in my presence, which is both accurate and complete.                 Clinical Impression:   Final diagnoses:  [L03.114] Cellulitis of left hand (Primary)        ED Disposition Condition    Discharge Stable          ED Prescriptions       Medication Sig Dispense Start Date End Date Auth. Provider    clindamycin (CLEOCIN) 150 MG capsule Take 3 capsules (450 mg total) by mouth every 8 (eight) hours. for 7 days 63 capsule 4/30/2023 5/7/2023 Ruth Dasilva MD          Follow-up Information       Follow up With Specialties Details Why Contact Info    primary care doctor  Schedule an appointment as soon as possible for a visit in 3 days for re-evaluation              Ruth Dasilva MD  04/30/23 0322